# Patient Record
Sex: FEMALE | Race: BLACK OR AFRICAN AMERICAN | NOT HISPANIC OR LATINO | Employment: FULL TIME | RURAL
[De-identification: names, ages, dates, MRNs, and addresses within clinical notes are randomized per-mention and may not be internally consistent; named-entity substitution may affect disease eponyms.]

---

## 2024-06-24 ENCOUNTER — OFFICE VISIT (OUTPATIENT)
Dept: OBSTETRICS AND GYNECOLOGY | Facility: CLINIC | Age: 54
End: 2024-06-24
Payer: COMMERCIAL

## 2024-06-24 ENCOUNTER — HOSPITAL ENCOUNTER (OUTPATIENT)
Dept: RADIOLOGY | Facility: HOSPITAL | Age: 54
Discharge: HOME | End: 2024-06-24
Attending: ADVANCED PRACTICE MIDWIFE
Payer: COMMERCIAL

## 2024-06-24 VITALS — TEMPERATURE: 98 F | WEIGHT: 258.63 LBS | OXYGEN SATURATION: 96 % | HEIGHT: 67 IN | BODY MASS INDEX: 40.59 KG/M2

## 2024-06-24 DIAGNOSIS — Z86.018 HISTORY OF UTERINE FIBROID: ICD-10-CM

## 2024-06-24 DIAGNOSIS — R93.89 THICKENED ENDOMETRIUM: ICD-10-CM

## 2024-06-24 DIAGNOSIS — N92.6 ABNORMAL BLEEDING IN MENSTRUAL CYCLE: ICD-10-CM

## 2024-06-24 DIAGNOSIS — N93.8 DUB (DYSFUNCTIONAL UTERINE BLEEDING): ICD-10-CM

## 2024-06-24 DIAGNOSIS — R10.2 PELVIC PAIN: ICD-10-CM

## 2024-06-24 DIAGNOSIS — N39.0 URINARY TRACT INFECTION WITHOUT HEMATURIA, SITE UNSPECIFIED: ICD-10-CM

## 2024-06-24 DIAGNOSIS — N85.2 ENLARGED UTERUS: ICD-10-CM

## 2024-06-24 DIAGNOSIS — D25.9 UTERINE LEIOMYOMA, UNSPECIFIED LOCATION: ICD-10-CM

## 2024-06-24 DIAGNOSIS — N89.8 VAGINAL DISCHARGE: ICD-10-CM

## 2024-06-24 DIAGNOSIS — Z12.4 CERVICAL CANCER SCREENING: ICD-10-CM

## 2024-06-24 DIAGNOSIS — R10.2 PELVIC PAIN: Primary | ICD-10-CM

## 2024-06-24 LAB
B-HCG UR QL: NEGATIVE
BILIRUB SERPL-MCNC: NORMAL MG/DL
BLOOD, POC UA: NORMAL
CANDIDA SPECIES: NEGATIVE
CTP QC/QA: YES
GARDNERELLA: POSITIVE
GLUCOSE UR QL STRIP: NORMAL
KETONES UR QL STRIP: NORMAL
LEUKOCYTE ESTERASE URINE, POC: NORMAL
NITRITE, POC UA: NORMAL
PH, POC UA: 6.5
PROTEIN, POC: NORMAL
SPECIFIC GRAVITY, POC UA: >=1.03
TRICHOMONAS: NEGATIVE
UROBILINOGEN, POC UA: 1

## 2024-06-24 PROCEDURE — 88142 CYTOPATH C/V THIN LAYER: CPT | Mod: TC,GCY | Performed by: ADVANCED PRACTICE MIDWIFE

## 2024-06-24 PROCEDURE — 3008F BODY MASS INDEX DOCD: CPT | Mod: ,,, | Performed by: ADVANCED PRACTICE MIDWIFE

## 2024-06-24 PROCEDURE — 87086 URINE CULTURE/COLONY COUNT: CPT | Mod: ,,, | Performed by: CLINICAL MEDICAL LABORATORY

## 2024-06-24 PROCEDURE — 81025 URINE PREGNANCY TEST: CPT | Mod: QW,,, | Performed by: ADVANCED PRACTICE MIDWIFE

## 2024-06-24 PROCEDURE — 99204 OFFICE O/P NEW MOD 45 MIN: CPT | Mod: ,,, | Performed by: ADVANCED PRACTICE MIDWIFE

## 2024-06-24 PROCEDURE — 76856 US EXAM PELVIC COMPLETE: CPT | Mod: TC

## 2024-06-24 PROCEDURE — 87491 CHLMYD TRACH DNA AMP PROBE: CPT | Mod: ,,, | Performed by: CLINICAL MEDICAL LABORATORY

## 2024-06-24 PROCEDURE — 1159F MED LIST DOCD IN RCRD: CPT | Mod: ,,, | Performed by: ADVANCED PRACTICE MIDWIFE

## 2024-06-24 PROCEDURE — 81003 URINALYSIS AUTO W/O SCOPE: CPT | Mod: QW,,, | Performed by: ADVANCED PRACTICE MIDWIFE

## 2024-06-24 PROCEDURE — 87591 N.GONORRHOEAE DNA AMP PROB: CPT | Mod: ,,, | Performed by: CLINICAL MEDICAL LABORATORY

## 2024-06-24 PROCEDURE — 87510 GARDNER VAG DNA DIR PROBE: CPT | Mod: ,,, | Performed by: CLINICAL MEDICAL LABORATORY

## 2024-06-24 PROCEDURE — 76856 US EXAM PELVIC COMPLETE: CPT | Mod: 26,,, | Performed by: RADIOLOGY

## 2024-06-24 PROCEDURE — 87480 CANDIDA DNA DIR PROBE: CPT | Mod: ,,, | Performed by: CLINICAL MEDICAL LABORATORY

## 2024-06-24 PROCEDURE — 87660 TRICHOMONAS VAGIN DIR PROBE: CPT | Mod: ,,, | Performed by: CLINICAL MEDICAL LABORATORY

## 2024-06-24 RX ORDER — ERGOCALCIFEROL 1.25 MG/1
50000 CAPSULE ORAL
COMMUNITY

## 2024-06-24 RX ORDER — NITROFURANTOIN 25; 75 MG/1; MG/1
100 CAPSULE ORAL 2 TIMES DAILY
Qty: 14 CAPSULE | Refills: 0 | Status: SHIPPED | OUTPATIENT
Start: 2024-06-24 | End: 2024-07-01

## 2024-06-24 NOTE — PROGRESS NOTES
"Subjective     Patient ID: Erik Buchanan is a 54 y.o. female.    Chief Complaint: problem (Back pain with cycle lasting 2 to 3 weeks when it comes on)    Here with c/o prolonged menstrual bleeding with LMP 6/2/24 and continues to have bleeding off and on with bleeding noted for 2-3 weeks, off a week and continues. She states she was diagnosed with uterine fibroids in the past at a clinic in [place], [place] and denies any surgical procedures.  She c/o lower abdominal pain and lower back pain in which the pain radiates from her back to the lower abdominal area. States she has lower right pelvic pain/discomforts. She denies any f/u with any provider for lower back discomforts. Pt stats she had the same problem 2022 and had a D&C performed and was given provera "the whole month to stop the bleeding".     States she is sexually active and denies any probability of any STD exposure.    States mammogram is scheduled for 7/2024. Last pap smear was 5/2023 in [place], [place].       Review of Systems   All other systems reviewed and are negative.       Objective     Physical Exam  Vitals reviewed. Exam conducted with a chaperone present.   Constitutional:       Appearance: Normal appearance. She is obese.   HENT:      Head: Normocephalic.   Cardiovascular:      Rate and Rhythm: Normal rate and regular rhythm.   Pulmonary:      Effort: Pulmonary effort is normal.   Abdominal:      General: Abdomen is flat.      Palpations: Abdomen is soft.   Genitourinary:     Exam position: Lithotomy position.      Vagina: Vaginal discharge present.      Cervix: Discharge present.      Uterus: Enlarged.       Adnexa: Right adnexa normal and left adnexa normal.      Comments: Thin white vaginal discharge noted  Musculoskeletal:         General: Normal range of motion.      Cervical back: Normal range of motion and neck supple.   Skin:     General: Skin is warm and dry.      Capillary Refill: Capillary refill takes less than 2 seconds. "   Neurological:      General: No focal deficit present.      Mental Status: She is alert and oriented to person, place, and time.   Psychiatric:         Mood and Affect: Mood normal.         Behavior: Behavior normal.         Thought Content: Thought content normal.         Judgment: Judgment normal.       US today @ [facility]:  EXAMINATION:  US PELVIS COMPLETE NON OB     CLINICAL HISTORY:  Pelvic and perineal pain     COMPARISON:  None     TECHNIQUE:  Multiple longitudinal and transverse sonographic images of the pelvis were obtained with transabdominal probe.     FINDINGS:  The uterus measures 15.3 x 7.4 x 9.4 cm. Multiple uterine fibroids are present.  Representative measures up to 3.4 cm.  Endometrial stripe is somewhat heterogeneous and measures up the 1.2 cm in thickness.  Consider endometrial biopsy with or without hysteroscopy.  Right ovary measurement of 2.5 x 3.6 x 2.3 cm given. 2.2 cm right ovarian cyst.  Left ovary measurement of 2.6 x 2.1 x 1.7 cm given.     Impression:     Enlarged fibroid uterus. Endometrial stripe is somewhat heterogeneous and measures up the 1.2 cm in thickness.  Consider endometrial biopsy with or without hysteroscopy.  2.2 cm right ovarian cyst.     Assessment and Plan     1. Pelvic pain  -     Cancel: US OB/GYN Procedure ([facility])-Future; Future  -     US Pelvis Complete Non OB; Future; Expected date: 06/24/2024  -     Chlamydia/GC, PCR; Future; Expected date: 06/24/2024  -     POCT Urinalysis  -     POCT URINE PREGNANCY    2. History of uterine fibroid  -     Cancel: US OB/GYN Procedure ([facility])-Future; Future    3. DUB (dysfunctional uterine bleeding)  -     Cancel: US OB/GYN Procedure ([facility])-Future; Future  -     Chlamydia/GC, PCR; Future; Expected date: 06/24/2024  -     POCT Urinalysis  -     POCT URINE PREGNANCY  -     CBC Auto Differential; Future; Expected date: 06/24/2024    4. Thickened endometrium    5. Uterine leiomyoma, unspecified location    6.  Enlarged uterus    7. Abnormal bleeding in menstrual cycle    8. BMI 40.0-44.9, adult    9. Cervical cancer screening  -     ThinPrep Pap Test; Future; Expected date: 06/24/2024    10. Vaginal discharge  -     Bacterial Vaginosis; Future; Expected date: 06/24/2024        To hospital today for pelvic ultrasound.  Vitamin D and multivitamin daily  Weight loss and exercise regimen encouraged  Healthy food choices  Discussed US results and f/u with Dr. Daniels for plan of care secondary to prolonged menses, thickened endometrium, enlarged uterus, uterine fibroids. Discussed possible hysterectomy or endometrial biopsy/hysteroscopy D&C. Informed pt that plan of care will be discussed with Dr. Daniels  Verbalized understanding to all instructions and information  Questions answered to desired level of satisfaction           Follow up if symptoms worsen or fail to improve, for f/u with Dr. Daniels for hystertomy/hysteroscopy eval in 1-2 wks; 1 yr annual with me.

## 2024-06-25 LAB
CHLAMYDIA BY PCR: NEGATIVE
N. GONORRHOEAE (GC) BY PCR: NEGATIVE

## 2024-06-26 LAB
GH SERPL-MCNC: NORMAL NG/ML
INSULIN SERPL-ACNC: NORMAL U[IU]/ML
LAB AP CLINICAL INFORMATION: NORMAL
LAB AP GYN INTERPRETATION: NEGATIVE
LAB AP PAP DISCLAIMER COMMENTS: NORMAL
RENIN PLAS-CCNC: NORMAL NG/ML/H
UA COMPLETE W REFLEX CULTURE PNL UR: NORMAL

## 2024-06-27 ENCOUNTER — TELEPHONE (OUTPATIENT)
Dept: OBSTETRICS AND GYNECOLOGY | Facility: CLINIC | Age: 54
End: 2024-06-27
Payer: COMMERCIAL

## 2024-06-27 NOTE — TELEPHONE ENCOUNTER
I attempted to leave a message for patient to return a call to the clinic and her answering service keep cutting off. Will send a letter with results----- Message from Petra Dunn CNM sent at 6/24/2024  4:41 PM CDT -----  UTI- rx sent, please call pt and notify her rx sent. Urine culture ordered.

## 2024-07-08 ENCOUNTER — OFFICE VISIT (OUTPATIENT)
Dept: OBSTETRICS AND GYNECOLOGY | Facility: CLINIC | Age: 54
End: 2024-07-08
Payer: COMMERCIAL

## 2024-07-08 VITALS
DIASTOLIC BLOOD PRESSURE: 73 MMHG | BODY MASS INDEX: 40.82 KG/M2 | SYSTOLIC BLOOD PRESSURE: 130 MMHG | WEIGHT: 260.63 LBS | HEART RATE: 86 BPM

## 2024-07-08 DIAGNOSIS — Z86.018 HISTORY OF UTERINE FIBROID: ICD-10-CM

## 2024-07-08 DIAGNOSIS — N93.8 DUB (DYSFUNCTIONAL UTERINE BLEEDING): ICD-10-CM

## 2024-07-08 DIAGNOSIS — R10.2 PELVIC PAIN: Primary | ICD-10-CM

## 2024-07-08 DIAGNOSIS — N85.2 ENLARGED UTERUS: ICD-10-CM

## 2024-07-08 DIAGNOSIS — R93.89 THICKENED ENDOMETRIUM: ICD-10-CM

## 2024-07-08 LAB
BASOPHILS # BLD AUTO: 0.05 K/UL (ref 0–0.2)
BASOPHILS NFR BLD AUTO: 0.7 % (ref 0–1)
DIFFERENTIAL METHOD BLD: ABNORMAL
EOSINOPHIL # BLD AUTO: 0.35 K/UL (ref 0–0.5)
EOSINOPHIL NFR BLD AUTO: 5 % (ref 1–4)
ERYTHROCYTE [DISTWIDTH] IN BLOOD BY AUTOMATED COUNT: 14.6 % (ref 11.5–14.5)
FSH SERPL-ACNC: 4.3 MIU/ML (ref 1.7–134.8)
HCT VFR BLD AUTO: 40.7 % (ref 38–47)
HGB BLD-MCNC: 12.7 G/DL (ref 12–16)
IMM GRANULOCYTES # BLD AUTO: 0.01 K/UL (ref 0–0.04)
IMM GRANULOCYTES NFR BLD: 0.1 % (ref 0–0.4)
LH SERPL-ACNC: 1.1 MIU/ML
LYMPHOCYTES # BLD AUTO: 2.71 K/UL (ref 1–4.8)
LYMPHOCYTES NFR BLD AUTO: 38.5 % (ref 27–41)
MCH RBC QN AUTO: 27.3 PG (ref 27–31)
MCHC RBC AUTO-ENTMCNC: 31.2 G/DL (ref 32–36)
MCV RBC AUTO: 87.3 FL (ref 80–96)
MONOCYTES # BLD AUTO: 0.6 K/UL (ref 0–0.8)
MONOCYTES NFR BLD AUTO: 8.5 % (ref 2–6)
MPC BLD CALC-MCNC: 12.9 FL (ref 9.4–12.4)
NEUTROPHILS # BLD AUTO: 3.31 K/UL (ref 1.8–7.7)
NEUTROPHILS NFR BLD AUTO: 47.2 % (ref 53–65)
NRBC # BLD AUTO: 0 X10E3/UL
NRBC, AUTO (.00): 0 %
PLATELET # BLD AUTO: 210 K/UL (ref 150–400)
RBC # BLD AUTO: 4.66 M/UL (ref 4.2–5.4)
TESTOST SERPL-MCNC: 14 NG/DL (ref 8–60)
TSH SERPL DL<=0.005 MIU/L-ACNC: 1.2 UIU/ML (ref 0.36–3.74)
WBC # BLD AUTO: 7.03 K/UL (ref 4.5–11)

## 2024-07-08 PROCEDURE — 83002 ASSAY OF GONADOTROPIN (LH): CPT | Mod: ,,, | Performed by: CLINICAL MEDICAL LABORATORY

## 2024-07-08 PROCEDURE — 85025 COMPLETE CBC W/AUTO DIFF WBC: CPT | Mod: ,,, | Performed by: CLINICAL MEDICAL LABORATORY

## 2024-07-08 PROCEDURE — 3075F SYST BP GE 130 - 139MM HG: CPT | Mod: ,,, | Performed by: OBSTETRICS & GYNECOLOGY

## 2024-07-08 PROCEDURE — 36415 COLL VENOUS BLD VENIPUNCTURE: CPT | Mod: ,,, | Performed by: OBSTETRICS & GYNECOLOGY

## 2024-07-08 PROCEDURE — 99214 OFFICE O/P EST MOD 30 MIN: CPT | Mod: ,,, | Performed by: OBSTETRICS & GYNECOLOGY

## 2024-07-08 PROCEDURE — 3008F BODY MASS INDEX DOCD: CPT | Mod: ,,, | Performed by: OBSTETRICS & GYNECOLOGY

## 2024-07-08 PROCEDURE — 84443 ASSAY THYROID STIM HORMONE: CPT | Mod: ,,, | Performed by: CLINICAL MEDICAL LABORATORY

## 2024-07-08 PROCEDURE — 83001 ASSAY OF GONADOTROPIN (FSH): CPT | Mod: ,,, | Performed by: CLINICAL MEDICAL LABORATORY

## 2024-07-08 PROCEDURE — 3078F DIAST BP <80 MM HG: CPT | Mod: ,,, | Performed by: OBSTETRICS & GYNECOLOGY

## 2024-07-08 PROCEDURE — 84403 ASSAY OF TOTAL TESTOSTERONE: CPT | Mod: ,,, | Performed by: CLINICAL MEDICAL LABORATORY

## 2024-07-29 ENCOUNTER — PROCEDURE VISIT (OUTPATIENT)
Dept: OBSTETRICS AND GYNECOLOGY | Facility: CLINIC | Age: 54
End: 2024-07-29
Payer: COMMERCIAL

## 2024-07-29 VITALS
WEIGHT: 263.38 LBS | DIASTOLIC BLOOD PRESSURE: 72 MMHG | HEART RATE: 76 BPM | BODY MASS INDEX: 41.25 KG/M2 | SYSTOLIC BLOOD PRESSURE: 149 MMHG

## 2024-07-29 DIAGNOSIS — Z86.018 HISTORY OF UTERINE FIBROID: ICD-10-CM

## 2024-07-29 DIAGNOSIS — N85.2 ENLARGED UTERUS: ICD-10-CM

## 2024-07-29 DIAGNOSIS — R10.2 PELVIC PAIN: ICD-10-CM

## 2024-07-29 DIAGNOSIS — R93.89 THICKENED ENDOMETRIUM: ICD-10-CM

## 2024-07-29 DIAGNOSIS — N93.8 DUB (DYSFUNCTIONAL UTERINE BLEEDING): Primary | ICD-10-CM

## 2024-07-29 LAB
B-HCG UR QL: NEGATIVE
CTP QC/QA: YES

## 2024-07-29 PROCEDURE — 81025 URINE PREGNANCY TEST: CPT | Mod: QW,,, | Performed by: OBSTETRICS & GYNECOLOGY

## 2024-07-29 PROCEDURE — 88305 TISSUE EXAM BY PATHOLOGIST: CPT | Mod: 26,,, | Performed by: PATHOLOGY

## 2024-07-29 PROCEDURE — 58558 HYSTEROSCOPY BIOPSY: CPT | Mod: ,,, | Performed by: OBSTETRICS & GYNECOLOGY

## 2024-07-29 PROCEDURE — 88305 TISSUE EXAM BY PATHOLOGIST: CPT | Mod: TC,SUR | Performed by: OBSTETRICS & GYNECOLOGY

## 2024-07-29 PROCEDURE — 99499 UNLISTED E&M SERVICE: CPT | Mod: ,,, | Performed by: OBSTETRICS & GYNECOLOGY

## 2024-07-31 LAB
DHEA SERPL-MCNC: NORMAL
ESTROGEN SERPL-MCNC: NORMAL PG/ML
INSULIN SERPL-ACNC: NORMAL U[IU]/ML
LAB AP CLINICAL INFORMATION: NORMAL
LAB AP GROSS DESCRIPTION: NORMAL
LAB AP LABORATORY NOTES: NORMAL
T3RU NFR SERPL: NORMAL %

## 2024-08-01 NOTE — PROCEDURES
Hysteorscopy, EMB Procedure Note:    Following informed written consent, the pt was placed in the lithotomy position. The speculum was inserted into the vagina and the cervix visualized with ease. The cervix was cleaned with betadine.  The uterus was sounded to 8 cm with the uterine sound. The hysteroscope was then inserted into the cervical os and the uterine cavity was directly visualized, bilateral ostea were noted at that time. Findings no lesions, polyps or fibroids seen. The hysteroscope was then removed and a biopsy of the endometrium performed with a pipelle. Specimen sent to pathology for evaluation. The single tooth tenaculum was then removed and the cervix was made hemostatic with pressure.

## 2024-08-12 ENCOUNTER — OFFICE VISIT (OUTPATIENT)
Dept: OBSTETRICS AND GYNECOLOGY | Facility: CLINIC | Age: 54
End: 2024-08-12
Payer: COMMERCIAL

## 2024-08-12 VITALS
HEART RATE: 78 BPM | BODY MASS INDEX: 41.04 KG/M2 | WEIGHT: 262 LBS | DIASTOLIC BLOOD PRESSURE: 74 MMHG | SYSTOLIC BLOOD PRESSURE: 136 MMHG

## 2024-08-12 DIAGNOSIS — N93.8 DUB (DYSFUNCTIONAL UTERINE BLEEDING): ICD-10-CM

## 2024-08-12 DIAGNOSIS — Z86.018 HISTORY OF UTERINE FIBROID: Primary | ICD-10-CM

## 2024-08-12 DIAGNOSIS — R10.2 PELVIC PAIN: ICD-10-CM

## 2024-08-12 DIAGNOSIS — N85.2 ENLARGED UTERUS: ICD-10-CM

## 2024-08-12 PROCEDURE — 3075F SYST BP GE 130 - 139MM HG: CPT | Mod: ,,, | Performed by: OBSTETRICS & GYNECOLOGY

## 2024-08-12 PROCEDURE — 3008F BODY MASS INDEX DOCD: CPT | Mod: ,,, | Performed by: OBSTETRICS & GYNECOLOGY

## 2024-08-12 PROCEDURE — 1159F MED LIST DOCD IN RCRD: CPT | Mod: ,,, | Performed by: OBSTETRICS & GYNECOLOGY

## 2024-08-12 PROCEDURE — 3078F DIAST BP <80 MM HG: CPT | Mod: ,,, | Performed by: OBSTETRICS & GYNECOLOGY

## 2024-08-12 PROCEDURE — 99214 OFFICE O/P EST MOD 30 MIN: CPT | Mod: ,,, | Performed by: OBSTETRICS & GYNECOLOGY

## 2024-08-12 NOTE — PROGRESS NOTES
Subjective     Patient ID: Erik Buchanan is a 54 y.o. female.    Chief Complaint:  Follow-up      History of Present Illness  HPI  at 6:00 AM.    GYN & OB History  Patient's last menstrual period was 2024 (approximate).   Date of Last Pap: 2024    OB History    Para Term  AB Living   4 2 1 1 1     SAB IAB Ectopic Multiple Live Births   1              # Outcome Date GA Lbr Alexandru/2nd Weight Sex Type Anes PTL Lv   4 Current            3  13 28w0d  1.332 kg (2 lb 15 oz) M Vag-Spont      2 SAB 1996 6w0d   U       1 Term 91 40w0d  3.941 kg (8 lb 11 oz) M Vag-Spont          Review of Systems  Review of Systems   Constitutional:  Negative for activity change, appetite change, fatigue and unexpected weight change.   HENT: Negative.     Respiratory:  Negative for cough, shortness of breath and wheezing.    Cardiovascular:  Negative for chest pain, palpitations and leg swelling.   Gastrointestinal:  Negative for abdominal pain, bloating, blood in stool, constipation, diarrhea, nausea, vomiting and reflux.   Genitourinary:  Positive for menorrhagia and menstrual problem. Negative for bladder incontinence, decreased libido, dysmenorrhea, dyspareunia, dysuria, flank pain, frequency, pelvic pain, urgency, vaginal bleeding, vaginal discharge, postcoital bleeding and vaginal odor.   Musculoskeletal:  Negative for back pain.   Integumentary:  Negative for rash, acne, hair changes, mole/lesion, breast mass, nipple discharge, breast skin changes and breast tenderness.   Neurological:  Negative for headaches.   Psychiatric/Behavioral:  Negative for depression and sleep disturbance. The patient is not nervous/anxious.    Breast: Negative for asymmetry, breast self exam, lump, mass, nipple discharge, skin changes and tenderness         Objective   Physical Exam:   Constitutional: She is oriented to person, place, and time. She appears well-developed and well-nourished.    HENT:   Head:  Normocephalic and atraumatic.    Eyes: Pupils are equal, round, and reactive to light. EOM are normal.     Cardiovascular:  Normal rate, regular rhythm and normal heart sounds.             Pulmonary/Chest: Effort normal and breath sounds normal.        Abdominal: Soft. Bowel sounds are normal.             Musculoskeletal: Normal range of motion and moves all extremeties.       Neurological: She is alert and oriented to person, place, and time. She has normal reflexes.     Psychiatric: She has a normal mood and affect. Her behavior is normal. Judgment and thought content normal.            Assessment and Plan     1. History of uterine fibroid    2. Pelvic pain    3. DUB (dysfunctional uterine bleeding)    4. Enlarged uterus             Plan:  Reviewed pathology results with patient.    A. Endometrium, biopsy:  - Disordered proliferative endometrium        Patient would like to proceed with robotic hysterectomy with bilateral salpingectomy and oophorectomy at [facility] on 09/19/2024

## 2024-09-16 ENCOUNTER — OFFICE VISIT (OUTPATIENT)
Dept: OBSTETRICS AND GYNECOLOGY | Facility: CLINIC | Age: 54
End: 2024-09-16
Payer: COMMERCIAL

## 2024-09-16 VITALS
HEART RATE: 86 BPM | WEIGHT: 263 LBS | DIASTOLIC BLOOD PRESSURE: 66 MMHG | SYSTOLIC BLOOD PRESSURE: 137 MMHG | BODY MASS INDEX: 41.19 KG/M2

## 2024-09-16 DIAGNOSIS — Z86.018 HISTORY OF UTERINE FIBROID: ICD-10-CM

## 2024-09-16 DIAGNOSIS — N93.8 DUB (DYSFUNCTIONAL UTERINE BLEEDING): Primary | ICD-10-CM

## 2024-09-16 DIAGNOSIS — Z01.818 PREOP TESTING: ICD-10-CM

## 2024-09-16 DIAGNOSIS — R10.2 PELVIC PAIN: ICD-10-CM

## 2024-09-16 LAB
ALBUMIN SERPL BCP-MCNC: 3.2 G/DL (ref 3.5–5)
ALBUMIN/GLOB SERPL: 0.7 {RATIO}
ALP SERPL-CCNC: 69 U/L (ref 41–108)
ALT SERPL W P-5'-P-CCNC: 21 U/L (ref 13–56)
ANION GAP SERPL CALCULATED.3IONS-SCNC: 11 MMOL/L (ref 7–16)
AST SERPL W P-5'-P-CCNC: 20 U/L (ref 15–37)
BACTERIA #/AREA URNS HPF: ABNORMAL /HPF
BASOPHILS # BLD AUTO: 0.07 K/UL (ref 0–0.2)
BASOPHILS NFR BLD AUTO: 0.9 % (ref 0–1)
BILIRUB SERPL-MCNC: 0.3 MG/DL (ref ?–1.2)
BILIRUB UR QL STRIP: NEGATIVE
BUN SERPL-MCNC: 8 MG/DL (ref 7–18)
BUN/CREAT SERPL: 16 (ref 6–20)
CALCIUM SERPL-MCNC: 9.3 MG/DL (ref 8.5–10.1)
CHLORIDE SERPL-SCNC: 107 MMOL/L (ref 98–107)
CLARITY UR: ABNORMAL
CO2 SERPL-SCNC: 26 MMOL/L (ref 21–32)
COLOR UR: YELLOW
CREAT SERPL-MCNC: 0.49 MG/DL (ref 0.55–1.02)
DIFFERENTIAL METHOD BLD: ABNORMAL
EGFR (NO RACE VARIABLE) (RUSH/TITUS): 113 ML/MIN/1.73M2
EOSINOPHIL # BLD AUTO: 0.18 K/UL (ref 0–0.5)
EOSINOPHIL NFR BLD AUTO: 2.2 % (ref 1–4)
ERYTHROCYTE [DISTWIDTH] IN BLOOD BY AUTOMATED COUNT: 14.3 % (ref 11.5–14.5)
GLOBULIN SER-MCNC: 4.4 G/DL (ref 2–4)
GLUCOSE SERPL-MCNC: 71 MG/DL (ref 74–106)
GLUCOSE UR STRIP-MCNC: NORMAL MG/DL
HCG SERPL-ACNC: <1 MIU/ML
HCT VFR BLD AUTO: 38.4 % (ref 38–47)
HGB BLD-MCNC: 12.4 G/DL (ref 12–16)
IMM GRANULOCYTES # BLD AUTO: 0.02 K/UL (ref 0–0.04)
IMM GRANULOCYTES NFR BLD: 0.2 % (ref 0–0.4)
INDIRECT COOMBS: NORMAL
KETONES UR STRIP-SCNC: NEGATIVE MG/DL
LEUKOCYTE ESTERASE UR QL STRIP: ABNORMAL
LYMPHOCYTES # BLD AUTO: 2.58 K/UL (ref 1–4.8)
LYMPHOCYTES NFR BLD AUTO: 31.9 % (ref 27–41)
MCH RBC QN AUTO: 27.7 PG (ref 27–31)
MCHC RBC AUTO-ENTMCNC: 32.3 G/DL (ref 32–36)
MCV RBC AUTO: 85.9 FL (ref 80–96)
MONOCYTES # BLD AUTO: 0.7 K/UL (ref 0–0.8)
MONOCYTES NFR BLD AUTO: 8.7 % (ref 2–6)
MPC BLD CALC-MCNC: 12.4 FL (ref 9.4–12.4)
MUCOUS, UA: ABNORMAL /LPF
NEUTROPHILS # BLD AUTO: 4.54 K/UL (ref 1.8–7.7)
NEUTROPHILS NFR BLD AUTO: 56.1 % (ref 53–65)
NITRITE UR QL STRIP: NEGATIVE
NRBC # BLD AUTO: 0 X10E3/UL
NRBC, AUTO (.00): 0 %
PH UR STRIP: 6 PH UNITS
PLATELET # BLD AUTO: 239 K/UL (ref 150–400)
POTASSIUM SERPL-SCNC: 3.6 MMOL/L (ref 3.5–5.1)
PROT SERPL-MCNC: 7.6 G/DL (ref 6.4–8.2)
PROT UR QL STRIP: 20
RBC # BLD AUTO: 4.47 M/UL (ref 4.2–5.4)
RBC # UR STRIP: NEGATIVE /UL
RBC #/AREA URNS HPF: 5 /HPF
RH BLD: NORMAL
SODIUM SERPL-SCNC: 140 MMOL/L (ref 136–145)
SP GR UR STRIP: 1.03
SPECIMEN OUTDATE: NORMAL
SQUAMOUS #/AREA URNS LPF: ABNORMAL /HPF
UROBILINOGEN UR STRIP-ACNC: NORMAL MG/DL
WBC # BLD AUTO: 8.09 K/UL (ref 4.5–11)
WBC #/AREA URNS HPF: 16 /HPF

## 2024-09-16 PROCEDURE — 86901 BLOOD TYPING SEROLOGIC RH(D): CPT | Mod: ,,, | Performed by: CLINICAL MEDICAL LABORATORY

## 2024-09-16 PROCEDURE — 3008F BODY MASS INDEX DOCD: CPT | Mod: ,,, | Performed by: OBSTETRICS & GYNECOLOGY

## 2024-09-16 PROCEDURE — 86850 RBC ANTIBODY SCREEN: CPT | Mod: ,,, | Performed by: CLINICAL MEDICAL LABORATORY

## 2024-09-16 PROCEDURE — 3078F DIAST BP <80 MM HG: CPT | Mod: ,,, | Performed by: OBSTETRICS & GYNECOLOGY

## 2024-09-16 PROCEDURE — 80053 COMPREHEN METABOLIC PANEL: CPT | Mod: ,,, | Performed by: CLINICAL MEDICAL LABORATORY

## 2024-09-16 PROCEDURE — 36415 COLL VENOUS BLD VENIPUNCTURE: CPT | Mod: ,,, | Performed by: OBSTETRICS & GYNECOLOGY

## 2024-09-16 PROCEDURE — 87086 URINE CULTURE/COLONY COUNT: CPT | Mod: ,,, | Performed by: CLINICAL MEDICAL LABORATORY

## 2024-09-16 PROCEDURE — 1159F MED LIST DOCD IN RCRD: CPT | Mod: ,,, | Performed by: OBSTETRICS & GYNECOLOGY

## 2024-09-16 PROCEDURE — 99499 UNLISTED E&M SERVICE: CPT | Mod: ,,, | Performed by: OBSTETRICS & GYNECOLOGY

## 2024-09-16 PROCEDURE — 84702 CHORIONIC GONADOTROPIN TEST: CPT | Mod: ,,, | Performed by: CLINICAL MEDICAL LABORATORY

## 2024-09-16 PROCEDURE — 86900 BLOOD TYPING SEROLOGIC ABO: CPT | Mod: ,,, | Performed by: CLINICAL MEDICAL LABORATORY

## 2024-09-16 PROCEDURE — 3075F SYST BP GE 130 - 139MM HG: CPT | Mod: ,,, | Performed by: OBSTETRICS & GYNECOLOGY

## 2024-09-16 PROCEDURE — 81001 URINALYSIS AUTO W/SCOPE: CPT | Mod: ,,, | Performed by: CLINICAL MEDICAL LABORATORY

## 2024-09-16 PROCEDURE — 85025 COMPLETE CBC W/AUTO DIFF WBC: CPT | Mod: ,,, | Performed by: CLINICAL MEDICAL LABORATORY

## 2024-09-16 NOTE — PROGRESS NOTES
History & Physical    SUBJECTIVE:     History of Present Illness:  Patient is a 54 y.o. female presents for pre op appointment. Patient is scheduled for hysterectomy with bilateral salpingectomy and oophorectomy on 09/19/2024 secondary to menorrhagia/DUB.      Chief Complaint   Patient presents with    Pre-op Exam       Review of patient's allergies indicates:  No Known Allergies    Current Outpatient Medications   Medication Sig Dispense Refill    ergocalciferol (VITAMIN D2) 50,000 unit Cap Take 50,000 Units by mouth every 7 days.      multivitamin with minerals tablet Take 1 tablet by mouth once daily.      multivitamin with minerals tablet Take 1 tablet by mouth once daily. (Patient not taking: Reported on 7/8/2024)       No current facility-administered medications for this visit.       Past Medical History:   Diagnosis Date    Anemia     Asthma     Fibroids 2013     Past Surgical History:   Procedure Laterality Date    DILATION AND CURETTAGE OF UTERUS  2022    performed due to prolonged heavy menses    SINUS SURGERY      TUBAL LIGATION       Family History   Problem Relation Name Age of Onset    Diabetes Maternal Grandmother      Diabetes Mother       Social History     Tobacco Use    Smoking status: Never     Passive exposure: Current    Smokeless tobacco: Never    Tobacco comments:     Pt works at a [employer].   Substance Use Topics    Alcohol use: Never    Drug use: Never        Review of Systems:  Review of Systems   Constitutional:  Negative for appetite change, chills, fatigue and fever.   HENT: Negative.     Eyes: Negative.    Respiratory:  Negative for cough, chest tightness and shortness of breath.    Cardiovascular:  Negative for chest pain, palpitations and leg swelling.   Gastrointestinal:  Negative for abdominal distention, abdominal pain, blood in stool, constipation, diarrhea, nausea and vomiting.   Endocrine: Negative for cold intolerance, heat intolerance, polydipsia, polyphagia and polyuria.    Genitourinary:  Positive for menstrual problem and pelvic pain. Negative for difficulty urinating, dyspareunia, dysuria, flank pain, frequency, urgency, vaginal bleeding, vaginal discharge and vaginal pain.   Musculoskeletal: Negative.    Skin: Negative.    Neurological: Negative.    Psychiatric/Behavioral: Negative.  Negative for agitation, behavioral problems, confusion and sleep disturbance. The patient is not nervous/anxious.        OBJECTIVE:     Vital Signs (Most Recent)  Pulse: 86 (09/16/24 1425)  BP: 137/66 (09/16/24 1425)     119.3 kg (263 lb)     Physical Exam:  Physical Exam  Vitals reviewed. Exam conducted with a chaperone present.   Constitutional:       Appearance: Normal appearance.   HENT:      Head: Normocephalic and atraumatic.      Mouth/Throat:      Mouth: Mucous membranes are moist.   Eyes:      Extraocular Movements: Extraocular movements intact.      Pupils: Pupils are equal, round, and reactive to light.   Cardiovascular:      Rate and Rhythm: Normal rate and regular rhythm.      Pulses: Normal pulses.      Heart sounds: Normal heart sounds.   Pulmonary:      Effort: Pulmonary effort is normal.      Breath sounds: Normal breath sounds.   Abdominal:      General: Abdomen is flat. Bowel sounds are normal.      Palpations: Abdomen is soft.   Musculoskeletal:         General: Normal range of motion.      Cervical back: Normal range of motion.   Skin:     General: Skin is warm and dry.   Neurological:      General: No focal deficit present.      Mental Status: She is alert and oriented to person, place, and time.   Psychiatric:         Mood and Affect: Mood normal.         Behavior: Behavior normal.         Thought Content: Thought content normal.         Judgment: Judgment normal.         Laboratory  Office Visit on 09/16/2024   Component Date Value Ref Range Status    HCG Quantitative 09/16/2024 <1  mIU/mL Final    Non-pregnant females aged 18 years to 62 years: 1 - 3 mIU/mL    Gestational Age           HCG mIU/mL  -------------------            -----------------  0.2 - 1 wk:                    5 - 50 mIU/mL  1 - 2 wks:                     50 - 500 mIU/mL  2 - 3 wks:                     100 - 5,000 mIU/mL  3 - 4 wks:                     500 - 10,000 mIU/mL  4 - 5 wks:                     1,000 - 50,000 mIU/mL  5 - 6 wks:                     10,000 - 100,000 mIU/mL  6 - 8 wks:                     15,000 - 200,000 mIU/mL  2 - 3 months:               10,000 - 100,000 mIU/mL    Sodium 09/16/2024 140  136 - 145 mmol/L Final    Potassium 09/16/2024 3.6  3.5 - 5.1 mmol/L Final    Chloride 09/16/2024 107  98 - 107 mmol/L Final    CO2 09/16/2024 26  21 - 32 mmol/L Final    Anion Gap 09/16/2024 11  7 - 16 mmol/L Final    Glucose 09/16/2024 71 (L)  74 - 106 mg/dL Final    BUN 09/16/2024 8  7 - 18 mg/dL Final    Creatinine 09/16/2024 0.49 (L)  0.55 - 1.02 mg/dL Final    BUN/Creatinine Ratio 09/16/2024 16  6 - 20 Final    Calcium 09/16/2024 9.3  8.5 - 10.1 mg/dL Final    Total Protein 09/16/2024 7.6  6.4 - 8.2 g/dL Final    Albumin 09/16/2024 3.2 (L)  3.5 - 5.0 g/dL Final    Globulin 09/16/2024 4.4 (H)  2.0 - 4.0 g/dL Final    A/G Ratio 09/16/2024 0.7   Final    Bilirubin, Total 09/16/2024 0.3  >0.0 - 1.2 mg/dL Final    Alk Phos 09/16/2024 69  41 - 108 U/L Final    ALT 09/16/2024 21  13 - 56 U/L Final    AST 09/16/2024 20  15 - 37 U/L Final    eGFR 09/16/2024 113  >=60 mL/min/1.73m2 Final    Specimen Outdate 09/16/2024 09/19/2024 23:59   Final    Group & Rh 09/16/2024 A POS   Final    Indirect Chas 09/16/2024 NEG   Final    Color, UA 09/16/2024 Yellow  Colorless, Straw, Yellow, Light Yellow, Dark Yellow Final    Clarity, UA 09/16/2024 Ex.Turbid  Clear Final    pH, UA 09/16/2024 6.0  5.0 to 8.0 pH Units Final    Leukocytes, UA 09/16/2024 Small (A)  Negative Final    Nitrites, UA 09/16/2024 Negative  Negative Final    Protein, UA 09/16/2024 20 (A)  Negative Final    Glucose, UA 09/16/2024 Normal  Normal mg/dL Final     Ketones, UA 09/16/2024 Negative  Negative mg/dL Final    Urobilinogen, UA 09/16/2024 Normal  0.2, 1.0, Normal mg/dL Final    Bilirubin, UA 09/16/2024 Negative  Negative Final    Blood, UA 09/16/2024 Negative  Negative Final    Specific Gravity, UA 09/16/2024 1.026  <=1.030 Final    WBC 09/16/2024 8.09  4.50 - 11.00 K/uL Final    RBC 09/16/2024 4.47  4.20 - 5.40 M/uL Final    Hemoglobin 09/16/2024 12.4  12.0 - 16.0 g/dL Final    Hematocrit 09/16/2024 38.4  38.0 - 47.0 % Final    MCV 09/16/2024 85.9  80.0 - 96.0 fL Final    MCH 09/16/2024 27.7  27.0 - 31.0 pg Final    MCHC 09/16/2024 32.3  32.0 - 36.0 g/dL Final    RDW 09/16/2024 14.3  11.5 - 14.5 % Final    Platelet Count 09/16/2024 239  150 - 400 K/uL Final    MPV 09/16/2024 12.4  9.4 - 12.4 fL Final    Neutrophils % 09/16/2024 56.1  53.0 - 65.0 % Final    Lymphocytes % 09/16/2024 31.9  27.0 - 41.0 % Final    Monocytes % 09/16/2024 8.7 (H)  2.0 - 6.0 % Final    Eosinophils % 09/16/2024 2.2  1.0 - 4.0 % Final    Basophils % 09/16/2024 0.9  0.0 - 1.0 % Final    Immature Granulocytes % 09/16/2024 0.2  0.0 - 0.4 % Final    nRBC, Auto 09/16/2024 0.0  <=0.0 % Final    Neutrophils, Abs 09/16/2024 4.54  1.80 - 7.70 K/uL Final    Lymphocytes, Absolute 09/16/2024 2.58  1.00 - 4.80 K/uL Final    Monocytes, Absolute 09/16/2024 0.70  0.00 - 0.80 K/uL Final    Eosinophils, Absolute 09/16/2024 0.18  0.00 - 0.50 K/uL Final    Basophils, Absolute 09/16/2024 0.07  0.00 - 0.20 K/uL Final    Immature Granulocytes, Absolute 09/16/2024 0.02  0.00 - 0.04 K/uL Final    nRBC, Absolute 09/16/2024 0.00  <=0.00 x10e3/uL Final    Diff Type 09/16/2024 Auto   Final    WBC, UA 09/16/2024 16 (H)  <=5 /hpf Final    RBC, UA 09/16/2024 5 (H)  <=3 /hpf Final    Bacteria, UA 09/16/2024 Few (A)  None Seen /hpf Final    Squamous Epithelial Cells, UA 09/16/2024 Many (A)  None Seen /HPF Final    Mucous 09/16/2024 Many (A)  None Seen /LPF Final    Culture, Urine 09/16/2024 Skin/Urogenital [place] Isolated, no  further workup.   Final         Diagnostic Results:  US: Reviewed      ASSESSMENT/PLAN:   There are no diagnoses linked to this encounter.    PLAN:Plan      Pt scheduled for TLH, BSO on 9/19/2024  Risks, benefits and alternatives to the procedure reviewed with the pt  After discussion of the risk, alternatives, benefits, and indication of surgery, as well as the risk of surgery, questions were sought and answered and informed consent obtained to proceed with surgery. We discussed the risk of the procedures to include, but not be limited to, 1) bleeding, which could be possibly excessive, potentially requiring a blood transfusion and subsequent risk of hepatitis (1 in 300,000), transfusion reaction (<1%), and HIV (1 in 1,000,000); 2) injury to internal organs including the bowel, bladder, great vessels, nerves, and ureters, potentially requiring further surgery at that time or at a later date; 3) infection requiring a prolonged hospital stay and antibiotics with possible drainage of an abscess or wound breakdown; 4) anesthetic complications; 5) deep venous thrombosis and the risk of pulmonary emboli; 6) pneumonia; and 7) infertility and/or menopause, 8) possible death. All questions were answered and a consent form was signed. She stated she understood the above risks and desired to proceed.   All questions answered to the level of the patient's satisfaction.   Case request and orders done.   Written materials provided  Consent obtained.

## 2024-09-18 LAB — UA COMPLETE W REFLEX CULTURE PNL UR: NORMAL

## 2024-09-19 ENCOUNTER — ANESTHESIA (OUTPATIENT)
Dept: SURGERY | Facility: HOSPITAL | Age: 54
End: 2024-09-19
Payer: COMMERCIAL

## 2024-09-19 ENCOUNTER — HOSPITAL ENCOUNTER (OUTPATIENT)
Facility: HOSPITAL | Age: 54
Discharge: HOME | End: 2024-09-19
Attending: OBSTETRICS & GYNECOLOGY | Admitting: OBSTETRICS & GYNECOLOGY
Payer: COMMERCIAL

## 2024-09-19 ENCOUNTER — ANESTHESIA EVENT (OUTPATIENT)
Dept: SURGERY | Facility: HOSPITAL | Age: 54
End: 2024-09-19
Payer: COMMERCIAL

## 2024-09-19 VITALS
SYSTOLIC BLOOD PRESSURE: 131 MMHG | WEIGHT: 263 LBS | RESPIRATION RATE: 20 BRPM | HEIGHT: 67 IN | HEART RATE: 87 BPM | TEMPERATURE: 97 F | BODY MASS INDEX: 41.28 KG/M2 | OXYGEN SATURATION: 96 % | DIASTOLIC BLOOD PRESSURE: 83 MMHG

## 2024-09-19 DIAGNOSIS — Z86.018 HISTORY OF UTERINE FIBROID: ICD-10-CM

## 2024-09-19 DIAGNOSIS — R10.2 PELVIC PAIN: ICD-10-CM

## 2024-09-19 DIAGNOSIS — N93.8 DUB (DYSFUNCTIONAL UTERINE BLEEDING): ICD-10-CM

## 2024-09-19 DIAGNOSIS — Z90.710 STATUS POST LAPAROSCOPIC HYSTERECTOMY: Primary | ICD-10-CM

## 2024-09-19 DIAGNOSIS — N85.2 ENLARGED UTERUS: ICD-10-CM

## 2024-09-19 LAB
BASOPHILS # BLD AUTO: 0.02 K/UL (ref 0–0.2)
BASOPHILS NFR BLD AUTO: 0.2 % (ref 0–1)
DIFFERENTIAL METHOD BLD: ABNORMAL
EOSINOPHIL # BLD AUTO: 0 K/UL (ref 0–0.5)
EOSINOPHIL NFR BLD AUTO: 0 % (ref 1–4)
ERYTHROCYTE [DISTWIDTH] IN BLOOD BY AUTOMATED COUNT: 14.1 % (ref 11.5–14.5)
HCT VFR BLD AUTO: 37.8 % (ref 38–47)
HGB BLD-MCNC: 12.1 G/DL (ref 12–16)
IMM GRANULOCYTES # BLD AUTO: 0.05 K/UL (ref 0–0.04)
IMM GRANULOCYTES NFR BLD: 0.5 % (ref 0–0.4)
LYMPHOCYTES # BLD AUTO: 1.08 K/UL (ref 1–4.8)
LYMPHOCYTES NFR BLD AUTO: 11.1 % (ref 27–41)
MCH RBC QN AUTO: 27.6 PG (ref 27–31)
MCHC RBC AUTO-ENTMCNC: 32 G/DL (ref 32–36)
MCV RBC AUTO: 86.3 FL (ref 80–96)
MONOCYTES # BLD AUTO: 0.12 K/UL (ref 0–0.8)
MONOCYTES NFR BLD AUTO: 1.2 % (ref 2–6)
MPC BLD CALC-MCNC: 11.7 FL (ref 9.4–12.4)
NEUTROPHILS # BLD AUTO: 8.46 K/UL (ref 1.8–7.7)
NEUTROPHILS NFR BLD AUTO: 87 % (ref 53–65)
NRBC # BLD AUTO: 0 X10E3/UL
NRBC, AUTO (.00): 0 %
PLATELET # BLD AUTO: 215 K/UL (ref 150–400)
RBC # BLD AUTO: 4.38 M/UL (ref 4.2–5.4)
WBC # BLD AUTO: 9.73 K/UL (ref 4.5–11)

## 2024-09-19 PROCEDURE — 27000716 HC OXISENSOR PROBE, ANY SIZE: Performed by: ANESTHESIOLOGY

## 2024-09-19 PROCEDURE — 27000509 HC TUBE SALEM SUMP ANY SIZE: Performed by: ANESTHESIOLOGY

## 2024-09-19 PROCEDURE — 25000003 PHARM REV CODE 250

## 2024-09-19 PROCEDURE — 88307 TISSUE EXAM BY PATHOLOGIST: CPT | Mod: TC,SUR | Performed by: OBSTETRICS & GYNECOLOGY

## 2024-09-19 PROCEDURE — 71000033 HC RECOVERY, INTIAL HOUR: Performed by: OBSTETRICS & GYNECOLOGY

## 2024-09-19 PROCEDURE — 71000016 HC POSTOP RECOV ADDL HR: Performed by: OBSTETRICS & GYNECOLOGY

## 2024-09-19 PROCEDURE — C2628 CATHETER, OCCLUSION: HCPCS | Performed by: OBSTETRICS & GYNECOLOGY

## 2024-09-19 PROCEDURE — 27000510 HC BLANKET BAIR HUGGER ANY SIZE: Performed by: ANESTHESIOLOGY

## 2024-09-19 PROCEDURE — 63600175 PHARM REV CODE 636 W HCPCS

## 2024-09-19 PROCEDURE — 71000015 HC POSTOP RECOV 1ST HR: Performed by: OBSTETRICS & GYNECOLOGY

## 2024-09-19 PROCEDURE — 85025 COMPLETE CBC W/AUTO DIFF WBC: CPT | Performed by: OBSTETRICS & GYNECOLOGY

## 2024-09-19 PROCEDURE — 36000711: Performed by: OBSTETRICS & GYNECOLOGY

## 2024-09-19 PROCEDURE — 63600175 PHARM REV CODE 636 W HCPCS: Performed by: ANESTHESIOLOGY

## 2024-09-19 PROCEDURE — 36415 COLL VENOUS BLD VENIPUNCTURE: CPT | Performed by: OBSTETRICS & GYNECOLOGY

## 2024-09-19 PROCEDURE — 37000009 HC ANESTHESIA EA ADD 15 MINS: Performed by: OBSTETRICS & GYNECOLOGY

## 2024-09-19 PROCEDURE — 27000655: Performed by: ANESTHESIOLOGY

## 2024-09-19 PROCEDURE — 37000008 HC ANESTHESIA 1ST 15 MINUTES: Performed by: OBSTETRICS & GYNECOLOGY

## 2024-09-19 PROCEDURE — 36000710: Performed by: OBSTETRICS & GYNECOLOGY

## 2024-09-19 PROCEDURE — 27201423 OPTIME MED/SURG SUP & DEVICES STERILE SUPPLY: Performed by: OBSTETRICS & GYNECOLOGY

## 2024-09-19 PROCEDURE — 58573 TLH W/T/O UTERUS OVER 250 G: CPT | Mod: ,,, | Performed by: OBSTETRICS & GYNECOLOGY

## 2024-09-19 PROCEDURE — 88307 TISSUE EXAM BY PATHOLOGIST: CPT | Mod: 26,,, | Performed by: PATHOLOGY

## 2024-09-19 PROCEDURE — 27000165 HC TUBE, ETT CUFFED: Performed by: ANESTHESIOLOGY

## 2024-09-19 PROCEDURE — 27000689 HC BLADE LARYNGOSCOPE ANY SIZE: Performed by: ANESTHESIOLOGY

## 2024-09-19 PROCEDURE — 25000003 PHARM REV CODE 250: Performed by: ANESTHESIOLOGY

## 2024-09-19 RX ORDER — DEXAMETHASONE SODIUM PHOSPHATE 4 MG/ML
INJECTION, SOLUTION INTRA-ARTICULAR; INTRALESIONAL; INTRAMUSCULAR; INTRAVENOUS; SOFT TISSUE
Status: DISCONTINUED | OUTPATIENT
Start: 2024-09-19 | End: 2024-09-19

## 2024-09-19 RX ORDER — FUROSEMIDE 10 MG/ML
INJECTION INTRAMUSCULAR; INTRAVENOUS
Status: DISCONTINUED | OUTPATIENT
Start: 2024-09-19 | End: 2024-09-19

## 2024-09-19 RX ORDER — MEPERIDINE HYDROCHLORIDE 25 MG/ML
25 INJECTION INTRAMUSCULAR; INTRAVENOUS; SUBCUTANEOUS EVERY 10 MIN PRN
Status: DISCONTINUED | OUTPATIENT
Start: 2024-09-19 | End: 2024-09-19 | Stop reason: HOSPADM

## 2024-09-19 RX ORDER — SCOLOPAMINE TRANSDERMAL SYSTEM 1 MG/1
1 PATCH, EXTENDED RELEASE TRANSDERMAL ONCE
Status: DISCONTINUED | OUTPATIENT
Start: 2024-09-19 | End: 2024-09-19 | Stop reason: HOSPADM

## 2024-09-19 RX ORDER — ONDANSETRON 4 MG/1
8 TABLET, ORALLY DISINTEGRATING ORAL EVERY 8 HOURS PRN
Status: DISCONTINUED | OUTPATIENT
Start: 2024-09-19 | End: 2024-09-19 | Stop reason: HOSPADM

## 2024-09-19 RX ORDER — PROMETHAZINE HYDROCHLORIDE 25 MG/ML
INJECTION, SOLUTION INTRAMUSCULAR; INTRAVENOUS
Status: DISCONTINUED | OUTPATIENT
Start: 2024-09-19 | End: 2024-09-19

## 2024-09-19 RX ORDER — ROCURONIUM BROMIDE 10 MG/ML
INJECTION, SOLUTION INTRAVENOUS
Status: DISCONTINUED | OUTPATIENT
Start: 2024-09-19 | End: 2024-09-19

## 2024-09-19 RX ORDER — ONDANSETRON HYDROCHLORIDE 2 MG/ML
4 INJECTION, SOLUTION INTRAVENOUS DAILY PRN
Status: DISCONTINUED | OUTPATIENT
Start: 2024-09-19 | End: 2024-09-19 | Stop reason: HOSPADM

## 2024-09-19 RX ORDER — ROPIVACAINE HYDROCHLORIDE 7.5 MG/ML
INJECTION, SOLUTION EPIDURAL; PERINEURAL
Status: COMPLETED | OUTPATIENT
Start: 2024-09-19 | End: 2024-09-19

## 2024-09-19 RX ORDER — PROPOFOL 10 MG/ML
VIAL (ML) INTRAVENOUS
Status: DISCONTINUED | OUTPATIENT
Start: 2024-09-19 | End: 2024-09-19

## 2024-09-19 RX ORDER — PHENYLEPHRINE HYDROCHLORIDE 10 MG/ML
INJECTION INTRAVENOUS
Status: DISCONTINUED | OUTPATIENT
Start: 2024-09-19 | End: 2024-09-19

## 2024-09-19 RX ORDER — DIPHENHYDRAMINE HYDROCHLORIDE 50 MG/ML
25 INJECTION INTRAMUSCULAR; INTRAVENOUS EVERY 6 HOURS PRN
Status: DISCONTINUED | OUTPATIENT
Start: 2024-09-19 | End: 2024-09-19 | Stop reason: HOSPADM

## 2024-09-19 RX ORDER — FENTANYL CITRATE 50 UG/ML
INJECTION, SOLUTION INTRAMUSCULAR; INTRAVENOUS
Status: DISCONTINUED | OUTPATIENT
Start: 2024-09-19 | End: 2024-09-19

## 2024-09-19 RX ORDER — SIMETHICONE 80 MG
80 TABLET,CHEWABLE ORAL EVERY 4 HOURS PRN
Status: DISCONTINUED | OUTPATIENT
Start: 2024-09-19 | End: 2024-09-19 | Stop reason: HOSPADM

## 2024-09-19 RX ORDER — HYDROMORPHONE HYDROCHLORIDE 2 MG/ML
0.5 INJECTION, SOLUTION INTRAMUSCULAR; INTRAVENOUS; SUBCUTANEOUS EVERY 5 MIN PRN
Status: DISCONTINUED | OUTPATIENT
Start: 2024-09-19 | End: 2024-09-19 | Stop reason: HOSPADM

## 2024-09-19 RX ORDER — GLUCAGON 1 MG
1 KIT INJECTION
Status: DISCONTINUED | OUTPATIENT
Start: 2024-09-19 | End: 2024-09-19 | Stop reason: HOSPADM

## 2024-09-19 RX ORDER — HYDROCODONE BITARTRATE AND ACETAMINOPHEN 7.5; 325 MG/1; MG/1
1 TABLET ORAL EVERY 6 HOURS PRN
Qty: 28 TABLET | Refills: 0 | Status: SHIPPED | OUTPATIENT
Start: 2024-09-19

## 2024-09-19 RX ORDER — MUPIROCIN 20 MG/G
1 OINTMENT TOPICAL 2 TIMES DAILY
Status: DISCONTINUED | OUTPATIENT
Start: 2024-09-19 | End: 2024-09-19 | Stop reason: HOSPADM

## 2024-09-19 RX ORDER — MORPHINE SULFATE 10 MG/ML
3 INJECTION INTRAMUSCULAR; INTRAVENOUS; SUBCUTANEOUS
Status: DISCONTINUED | OUTPATIENT
Start: 2024-09-19 | End: 2024-09-19 | Stop reason: HOSPADM

## 2024-09-19 RX ORDER — MORPHINE SULFATE 10 MG/ML
4 INJECTION INTRAMUSCULAR; INTRAVENOUS; SUBCUTANEOUS EVERY 5 MIN PRN
Status: DISCONTINUED | OUTPATIENT
Start: 2024-09-19 | End: 2024-09-19 | Stop reason: HOSPADM

## 2024-09-19 RX ORDER — HYDROCODONE BITARTRATE AND ACETAMINOPHEN 5; 325 MG/1; MG/1
1 TABLET ORAL EVERY 4 HOURS PRN
Status: DISCONTINUED | OUTPATIENT
Start: 2024-09-19 | End: 2024-09-19 | Stop reason: HOSPADM

## 2024-09-19 RX ORDER — DIPHENHYDRAMINE HCL 25 MG
25 CAPSULE ORAL EVERY 4 HOURS PRN
Status: DISCONTINUED | OUTPATIENT
Start: 2024-09-19 | End: 2024-09-19 | Stop reason: HOSPADM

## 2024-09-19 RX ORDER — ONDANSETRON HYDROCHLORIDE 2 MG/ML
INJECTION, SOLUTION INTRAVENOUS
Status: DISCONTINUED | OUTPATIENT
Start: 2024-09-19 | End: 2024-09-19

## 2024-09-19 RX ORDER — LIDOCAINE HYDROCHLORIDE 20 MG/ML
INJECTION, SOLUTION EPIDURAL; INFILTRATION; INTRACAUDAL; PERINEURAL
Status: DISCONTINUED | OUTPATIENT
Start: 2024-09-19 | End: 2024-09-19

## 2024-09-19 RX ORDER — SODIUM CHLORIDE, SODIUM LACTATE, POTASSIUM CHLORIDE, CALCIUM CHLORIDE 600; 310; 30; 20 MG/100ML; MG/100ML; MG/100ML; MG/100ML
INJECTION, SOLUTION INTRAVENOUS CONTINUOUS
Status: DISCONTINUED | OUTPATIENT
Start: 2024-09-19 | End: 2024-09-19 | Stop reason: HOSPADM

## 2024-09-19 RX ORDER — MIDAZOLAM HYDROCHLORIDE 1 MG/ML
INJECTION INTRAMUSCULAR; INTRAVENOUS
Status: DISCONTINUED | OUTPATIENT
Start: 2024-09-19 | End: 2024-09-19

## 2024-09-19 RX ORDER — HYDROMORPHONE HYDROCHLORIDE 2 MG/ML
INJECTION, SOLUTION INTRAMUSCULAR; INTRAVENOUS; SUBCUTANEOUS
Status: DISCONTINUED | OUTPATIENT
Start: 2024-09-19 | End: 2024-09-19

## 2024-09-19 RX ADMIN — MIDAZOLAM HYDROCHLORIDE 2 MG: 1 INJECTION, SOLUTION INTRAMUSCULAR; INTRAVENOUS at 07:09

## 2024-09-19 RX ADMIN — DEXAMETHASONE SODIUM PHOSPHATE 8 MG: 4 INJECTION, SOLUTION INTRA-ARTICULAR; INTRALESIONAL; INTRAMUSCULAR; INTRAVENOUS; SOFT TISSUE at 07:09

## 2024-09-19 RX ADMIN — DEXTROSE MONOHYDRATE 2 G: 50 INJECTION, SOLUTION INTRAVENOUS at 07:09

## 2024-09-19 RX ADMIN — SCOPOLAMINE 1 PATCH: 1.5 PATCH, EXTENDED RELEASE TRANSDERMAL at 06:09

## 2024-09-19 RX ADMIN — HYDROMORPHONE HYDROCHLORIDE 0.5 MG: 2 INJECTION, SOLUTION INTRAMUSCULAR; INTRAVENOUS; SUBCUTANEOUS at 08:09

## 2024-09-19 RX ADMIN — FENTANYL CITRATE 100 MCG: 50 INJECTION, SOLUTION INTRAMUSCULAR; INTRAVENOUS at 07:09

## 2024-09-19 RX ADMIN — PROPOFOL 150 MG: 10 INJECTION, EMULSION INTRAVENOUS at 07:09

## 2024-09-19 RX ADMIN — ROCURONIUM BROMIDE 50 MG: 10 INJECTION, SOLUTION INTRAVENOUS at 07:09

## 2024-09-19 RX ADMIN — SODIUM CHLORIDE: 9 INJECTION, SOLUTION INTRAVENOUS at 07:09

## 2024-09-19 RX ADMIN — PHENYLEPHRINE HYDROCHLORIDE 100 MCG: 10 INJECTION INTRAVENOUS at 07:09

## 2024-09-19 RX ADMIN — SODIUM CHLORIDE, POTASSIUM CHLORIDE, SODIUM LACTATE AND CALCIUM CHLORIDE: 600; 310; 30; 20 INJECTION, SOLUTION INTRAVENOUS at 08:09

## 2024-09-19 RX ADMIN — PHENYLEPHRINE HYDROCHLORIDE 100 MCG: 10 INJECTION INTRAVENOUS at 08:09

## 2024-09-19 RX ADMIN — HYDROMORPHONE HYDROCHLORIDE 0.5 MG: 2 INJECTION, SOLUTION INTRAMUSCULAR; INTRAVENOUS; SUBCUTANEOUS at 09:09

## 2024-09-19 RX ADMIN — FUROSEMIDE 10 MG: 10 INJECTION, SOLUTION INTRAMUSCULAR; INTRAVENOUS at 08:09

## 2024-09-19 RX ADMIN — ONDANSETRON 4 MG: 2 INJECTION INTRAMUSCULAR; INTRAVENOUS at 07:09

## 2024-09-19 RX ADMIN — ROPIVACAINE HYDROCHLORIDE 20 ML: 7.5 INJECTION, SOLUTION EPIDURAL; PERINEURAL at 09:09

## 2024-09-19 RX ADMIN — LIDOCAINE HYDROCHLORIDE 100 MG: 20 INJECTION, SOLUTION INTRAVENOUS at 07:09

## 2024-09-19 RX ADMIN — PROMETHAZINE HYDROCHLORIDE 5 MG: 25 INJECTION INTRAMUSCULAR; INTRAVENOUS at 07:09

## 2024-09-19 RX ADMIN — SUGAMMADEX 200 MG: 100 INJECTION, SOLUTION INTRAVENOUS at 09:09

## 2024-09-19 NOTE — ANESTHESIA PROCEDURE NOTES
Peripheral Block    Patient location during procedure: OR   Block not for primary anesthetic.  Reason for block: at surgeon's request and post-op pain management   Post-op Pain Location: Bilateral abdomen   Start time: 9/19/2024 9:19 AM  Timeout: 9/19/2024 9:18 AM   End time: 9/19/2024 9:23 AM    Staffing  Authorizing Provider: Clinton Carreon MD  Performing Provider: Clinton Carreon MD    Staffing  Performed by: Clinton Carreon MD  Authorized by: Clinton Carreon MD    Preanesthetic Checklist  Completed: patient identified, risks and benefits discussed, pre-op evaluation and timeout performed  Peripheral Block  Patient position: supine  Prep: ChloraPrep  Block type: TAP  Laterality: bilateral  Injection technique: single shot  Needle  Needle localization: ultrasound guidance     Assessment  Injection assessment: negative aspiration    Medications:    Medications: ROPIvacaine (NAROPIN) injection 0.75% - Perineural   20 mL - 9/19/2024 9:23:00 AM    Additional Notes  Ropivicaine diluted to 0.375%.   20 cc on each side.   No complications.

## 2024-09-19 NOTE — ANESTHESIA PREPROCEDURE EVALUATION
09/19/2024  Erik Buchanan is a 54 y.o., female.      Pre-op Assessment    I have reviewed the Patient Summary Reports.    I have reviewed the NPO Status.   I have reviewed the Medications.     Review of Systems         Anesthesia Plan  Type of Anesthesia, risks & benefits discussed:    Anesthesia Type: Gen ETT, Regional  Intra-op Monitoring Plan: Standard ASA Monitors  Post Op Pain Control Plan: multimodal analgesia  Induction:  IV  Informed Consent: Informed consent signed with the Patient and all parties understand the risks and agree with anesthesia plan.  All questions answered.   ASA Score: 1    Ready For Surgery From Anesthesia Perspective.     .  NPO greater than 8 hours  No anesthetic complications  NKDA    Hct 38  HCG negative    Denies other medical conditions    MP I; adequate ROM at neck    Plan is GETA plus TAP block

## 2024-09-19 NOTE — TRANSFER OF CARE
"Anesthesia Transfer of Care Note    Patient: Erik Buchanan    Procedure(s) Performed: Procedure(s) (LRB):  HYSTERECTOMY,TOTAL,LAPAROSCOPIC,WITH SALPINGO-OOPHORECTOMY (Bilateral)  CYSTOSCOPY (N/A)    Patient location: PACU    Anesthesia Type: general    Transport from OR: Transported from OR on 6-10 L/min O2 by face mask with adequate spontaneous ventilation    Post pain: adequate analgesia    Post assessment: no apparent anesthetic complications and tolerated procedure well    Post vital signs: stable    Level of consciousness: responds to stimulation    Nausea/Vomiting: no nausea/vomiting    Complications: none    Transfer of care protocol was followedComments: Report Given to PACU rn VSS      Last vitals: Visit Vitals  /86   Pulse 87   Temp 36.3 °C (97.3 °F) (Oral)   Resp 19   Ht 5' 7" (1.702 m)   Wt 119.3 kg (263 lb)   LMP 07/22/2024 (Approximate)   SpO2 (!) 91%   Breastfeeding No   BMI 41.19 kg/m²     "

## 2024-09-19 NOTE — OP NOTE
DATE OF PROCEDURE: 09/19/2024    PREOPERATIVE DIAGNOSES:   1. DUB  2.enlarged uterus       POSTOPERATIVE DIAGNOSES:   1. same      PROCEDURE: Single-site TLH, BSO    SURGEON: Bailey Guzman MD    ASSISTANT: general    ANESTHESIA: general    ESTIMATED BLOOD LOSS: 50 mL.     COMPLICATIONS: None.     FINDINGS: uterus sounds to 12 cm, multiple leiomyomas    PROCEDURE IN DETAIL:   Following informed consent the patient was taken to the operating room where general anesthesia was administered without difficulty. She was prepped and draped in usual sterile fashion placed dorsolithotomy position. A weighted speculum was placed the vagina and the anterior lip cervix grasped single-toothed tenaculum. The uterus was gently sounded and found to be 8 cm in length. A V care was then inserted into the uterus for adequate manipulation. Attention was then turned to the abdomen where a 3 mm incision was made in the umbilicus with the scalpel. The fascial incision was entered with the [place] scissors and extended laterally. The peritoneum was entered manually with ease. The single site gel port was then introduced to the abdomen with ease through the umbilical incision and the pneumoperitoneum achieved with 3 L of CO2 gas. The patient was then placed in deep Trendelenburg position and a pelvic exam with the findings noted above. The IP ligament was grasped with the Ligasure device , cauterized and  transected with hemostasis assured removing the left ovary.. The round ligament on the left was clamped cauterized transected hemostasis found be satisfactory. Anterior posterior leafs of the broad ligament were entered using the scissors and bladder flap created to midline with ease. The steps were then repeated on the right with hemostasis found be satisfactory. The uterus and cervix were then amputated using the J-hook along the co-ring and the specimen delivered through the vagina with ease. The vaginal cuff was then closed with  number 1V LOC starting the right angle suturing across to left and back in the midline. The vaginal cuff was found be hemostatic and airtight. The pelvis was then copiously  irrigated with normal saline dissatisfaction. The right and left ureters were identified and found to be peristalsing nicely. The pneumoperitoneum evacuated and the gel port removed from the umbilicus. The fascial incision was repaired with 0 Vicryl in running fashion. The skin was closed with 4-0 Vicryl and Surgicel. The patient was given. An intraoperative cystoscopy. Performed with eflux of urine noted from both the right and left ureters. All instrument removed from the patient was waking general anesthesia and taken cover room stable condition. At the end of the procedure all sponge lap needle and instrument counts correct ×2

## 2024-09-19 NOTE — ANESTHESIA POSTPROCEDURE EVALUATION
Anesthesia Post Evaluation    Patient: Erik Buchanan    Procedure(s) Performed: Procedure(s) (LRB):  HYSTERECTOMY,TOTAL,LAPAROSCOPIC,WITH SALPINGO-OOPHORECTOMY (Bilateral)  CYSTOSCOPY (N/A)    Final Anesthesia Type: general      Patient location during evaluation: PACU  Post-procedure vital signs: reviewed and stable  Pain management: adequate  Airway patency: patent    PONV status at discharge: No PONV  Anesthetic complications: no      Cardiovascular status: hemodynamically stable  Respiratory status: unassisted  Hydration status: euvolemic  Follow-up not needed.              Vitals Value Taken Time   /70 09/19/24 1331   Temp 36.3 °C (97.3 °F) 09/19/24 0949   Pulse 86 09/19/24 1332   Resp 20 09/19/24 1300   SpO2 96 % 09/19/24 1332   Vitals shown include unfiled device data.      Event Time   Out of Recovery 10:30:00         Pain/Carly Score: Carly Score: 10 (9/19/2024  1:40 PM)  Modified Carly Score: 20 (9/19/2024  1:40 PM)

## 2024-09-19 NOTE — ANESTHESIA PROCEDURE NOTES
Intubation    Date/Time: 9/19/2024 7:13 AM    Performed by: Margy Rosas  Authorized by: Clinton Carreon MD    Intubation:     Induction:  Inhalational - mask    Intubated:  Postinduction    Mask Ventilation:  Easy mask    Attempts:  1    Attempted By:  Student    Method of Intubation:  Direct    Blade:  Lopez 2    Laryngeal View Grade: Grade I - full view of cords      Difficult Airway Encountered?: No      Complications:  None    Airway Device:  Oral endotracheal tube    Airway Device Size:  7.5    Style/Cuff Inflation:  Cuffed    Inflation Amount (mL):  10    Tube secured:  22    Secured at:  The lips    Placement Verified By:  Capnometry    Complicating Factors:  None    Findings Post-Intubation:  BS equal bilateral and atraumatic/condition of teeth unchanged  Notes:      Intubated by KAILA Rosas SRNA

## 2024-09-19 NOTE — PROGRESS NOTES
943 RECEIVED TO RR EASILY AROUSED, SLEEPY NASAL AIRWAY RIGHT NOSTRIL O2 VIA FM. NO RESP. DISTRESS NOTED. ABDOMEN SOFT WITH OP-SITE NAVEL AREA APPROXIMATED WITH DERMABOND ADHESIVE DRESSING. RIAZ PAD WITH SCANT DRAINAGE NOTED. CLEAN PAD APPLIED.  SCD HOSE TO LOWER EXTREMITIES. IV INFUSING WELL LEFT HAND 20G. CATH. REPOSITION SELF ON LEFT SIDE. NO DISTRESS NOTED. SEE FLOWSHEET.    1015 SLEEPY, FOLLOWS COMMANDS, ENCOURAGED COUGH DEEP BREATHS. NO C/O PAIN.      1020 NASAL TRUMPET REMOVED, MORE AWAKE. ENCOURAGED DEEP BREATHS AND COUGH. NO C/O PAIN. NO VAGINAL BLEEDING ABDOMEN SOFT. O2 VIA NC.      1030 AWAKE, ALERT. NO C/O PAIN. NO S/S OF DISTRESS NOTED. TRANSFERRED TO ROOM.

## 2024-09-20 LAB
ESTROGEN SERPL-MCNC: NORMAL PG/ML
INSULIN SERPL-ACNC: NORMAL U[IU]/ML
LAB AP GROSS DESCRIPTION: NORMAL
LAB AP LABORATORY NOTES: NORMAL
T3RU NFR SERPL: NORMAL %

## 2024-10-07 ENCOUNTER — OFFICE VISIT (OUTPATIENT)
Dept: OBSTETRICS AND GYNECOLOGY | Facility: CLINIC | Age: 54
End: 2024-10-07
Payer: COMMERCIAL

## 2024-10-07 VITALS
DIASTOLIC BLOOD PRESSURE: 71 MMHG | HEART RATE: 84 BPM | WEIGHT: 255.81 LBS | BODY MASS INDEX: 40.06 KG/M2 | SYSTOLIC BLOOD PRESSURE: 131 MMHG

## 2024-10-07 DIAGNOSIS — Z90.710 STATUS POST LAPAROSCOPIC HYSTERECTOMY: Primary | ICD-10-CM

## 2024-10-07 PROCEDURE — 3075F SYST BP GE 130 - 139MM HG: CPT | Mod: ,,, | Performed by: OBSTETRICS & GYNECOLOGY

## 2024-10-07 PROCEDURE — 99024 POSTOP FOLLOW-UP VISIT: CPT | Mod: ,,, | Performed by: OBSTETRICS & GYNECOLOGY

## 2024-10-07 PROCEDURE — 3078F DIAST BP <80 MM HG: CPT | Mod: ,,, | Performed by: OBSTETRICS & GYNECOLOGY

## 2024-10-07 NOTE — PROGRESS NOTES
Subjective     Patient ID: Erik Buchanan is a 54 y.o. female.    Chief Complaint:  Post-op Evaluation      History of Present Illness  HPI  Postoperative Follow-up  Patient presents to the clinic 2 weeks status post total laparoscopic hysterectomy, bilateral oophorectomy, and bilateral salpingectomy for abnormal uterine bleeding and pelvic pain. Eating a regular diet without difficulty. Bowel movements are normal. The patient is not having any pain.    GYN & OB History  Patient's last menstrual period was 2024 (approximate).   Date of Last Pap: 2024    OB History    Para Term  AB Living   4 2 1 1 1     SAB IAB Ectopic Multiple Live Births   1              # Outcome Date GA Lbr Alexandru/2nd Weight Sex Type Anes PTL Lv   4             3  13 28w0d  1.332 kg (2 lb 15 oz) M Vag-Spont      2 SAB 1996 6w0d   U       1 Term 91 40w0d  3.941 kg (8 lb 11 oz) M Vag-Spont          Review of Systems  Review of Systems   Constitutional:  Negative for activity change, appetite change, fatigue and unexpected weight change.   HENT: Negative.     Respiratory:  Negative for cough, shortness of breath and wheezing.    Cardiovascular:  Negative for chest pain, palpitations and leg swelling.   Gastrointestinal:  Negative for abdominal pain, bloating, blood in stool, constipation, diarrhea, nausea, vomiting and reflux.   Genitourinary:  Negative for bladder incontinence, decreased libido, dysmenorrhea, dyspareunia, dysuria, flank pain, frequency, hot flashes, menorrhagia, menstrual problem, pelvic pain, urgency, vaginal bleeding, vaginal discharge, urinary incontinence, postcoital bleeding, postmenopausal bleeding, vaginal dryness and vaginal odor.   Musculoskeletal:  Negative for back pain.   Integumentary:  Negative for rash, acne, hair changes, mole/lesion, breast mass, nipple discharge, breast skin changes and breast tenderness.   Neurological:  Negative for headaches.    Psychiatric/Behavioral:  Negative for depression and sleep disturbance. The patient is not nervous/anxious.    Breast: Negative for asymmetry, breast self exam, lump, mass, nipple discharge, skin changes and tenderness         Objective   Physical Exam:   Constitutional: She is oriented to person, place, and time. She appears well-developed and well-nourished.    HENT:   Head: Normocephalic and atraumatic.    Eyes: Pupils are equal, round, and reactive to light. EOM are normal.     Cardiovascular:  Normal rate, regular rhythm and normal heart sounds.      Exam reveals no clubbing, no cyanosis and no edema.        Pulmonary/Chest: Effort normal and breath sounds normal.        Abdominal: Soft. Bowel sounds are normal. She exhibits abdominal incision (umbilical incision healing well). She exhibits no distension.     Genitourinary:    Uterus normal.             Musculoskeletal: Normal range of motion and moves all extremeties.       Neurological: She is alert and oriented to person, place, and time.    Skin: Skin is warm and dry. No cyanosis. Nails show no clubbing.    Psychiatric: She has a normal mood and affect. Her behavior is normal. Judgment and thought content normal.            Assessment and Plan     1. Status post laparoscopic hysterectomy       Patient is allowed to drive only/ RTC in 4 weeks.      Plan:  Pelvic rest  RTC in 4 weeks

## 2024-11-04 ENCOUNTER — OFFICE VISIT (OUTPATIENT)
Dept: OBSTETRICS AND GYNECOLOGY | Facility: CLINIC | Age: 54
End: 2024-11-04
Payer: COMMERCIAL

## 2024-11-04 VITALS
DIASTOLIC BLOOD PRESSURE: 79 MMHG | HEART RATE: 84 BPM | SYSTOLIC BLOOD PRESSURE: 155 MMHG | BODY MASS INDEX: 40.31 KG/M2 | WEIGHT: 257.38 LBS

## 2024-11-04 DIAGNOSIS — Z90.710 STATUS POST LAPAROSCOPIC HYSTERECTOMY: ICD-10-CM

## 2024-11-04 DIAGNOSIS — Z12.31 ENCOUNTER FOR SCREENING MAMMOGRAM FOR HIGH-RISK PATIENT: Primary | ICD-10-CM

## 2024-11-04 PROCEDURE — 3077F SYST BP >= 140 MM HG: CPT | Mod: ,,, | Performed by: OBSTETRICS & GYNECOLOGY

## 2024-11-04 PROCEDURE — 99024 POSTOP FOLLOW-UP VISIT: CPT | Mod: ,,, | Performed by: OBSTETRICS & GYNECOLOGY

## 2024-11-04 PROCEDURE — 3008F BODY MASS INDEX DOCD: CPT | Mod: ,,, | Performed by: OBSTETRICS & GYNECOLOGY

## 2024-11-04 PROCEDURE — 3078F DIAST BP <80 MM HG: CPT | Mod: ,,, | Performed by: OBSTETRICS & GYNECOLOGY

## 2024-11-04 PROCEDURE — 1159F MED LIST DOCD IN RCRD: CPT | Mod: ,,, | Performed by: OBSTETRICS & GYNECOLOGY

## 2024-11-04 NOTE — PROGRESS NOTES
Subjective     Patient ID: Erik Buchanan is a 54 y.o. female.    Chief Complaint:  Post-op Evaluation      History of Present Illness  HPI  Postoperative Follow-up  Patient presents to the clinic 6 weeks status post total laparoscopic hysterectomy for abnormal uterine bleeding. Eating a regular diet without difficulty. Bowel movements are normal. The patient is not having any pain.    GYN & OB History  Patient's last menstrual period was 2024 (approximate).   Date of Last Pap: 2024    OB History    Para Term  AB Living   4 2 1 1 1     SAB IAB Ectopic Multiple Live Births   1              # Outcome Date GA Lbr Alexandru/2nd Weight Sex Type Anes PTL Lv   4             3  13 28w0d  1.332 kg (2 lb 15 oz) M Vag-Spont      2 SAB 1996 6w0d   U       1 Term 91 40w0d  3.941 kg (8 lb 11 oz) M Vag-Spont          Review of Systems  Review of Systems   Constitutional:  Negative for activity change, appetite change, fatigue and unexpected weight change.   HENT: Negative.     Respiratory:  Negative for cough, shortness of breath and wheezing.    Cardiovascular:  Negative for chest pain, palpitations and leg swelling.   Gastrointestinal:  Negative for abdominal pain, bloating, blood in stool, constipation, diarrhea, nausea, vomiting and reflux.   Genitourinary:  Negative for bladder incontinence, decreased libido, dysmenorrhea, dyspareunia, dysuria, flank pain, frequency, hot flashes, menorrhagia, menstrual problem, pelvic pain, urgency, vaginal bleeding, vaginal discharge, urinary incontinence, postcoital bleeding, postmenopausal bleeding, vaginal dryness and vaginal odor.   Musculoskeletal:  Negative for back pain.   Integumentary:  Negative for rash, acne, hair changes, mole/lesion, breast mass, nipple discharge, breast skin changes and breast tenderness.   Neurological:  Negative for headaches.   Psychiatric/Behavioral:  Negative for depression and sleep disturbance. The  patient is not nervous/anxious.    Breast: Negative for asymmetry, breast self exam, lump, mass, nipple discharge, skin changes and tenderness         Objective   Physical Exam:   Constitutional: She is oriented to person, place, and time. She appears well-developed and well-nourished.    HENT:   Head: Normocephalic and atraumatic.    Eyes: Pupils are equal, round, and reactive to light. Conjunctivae and EOM are normal.     Cardiovascular:  Normal rate, regular rhythm and normal heart sounds.             Pulmonary/Chest: Effort normal and breath sounds normal.        Abdominal: Soft. Bowel sounds are normal.     Genitourinary:    Vagina, right adnexa and left adnexa normal.      Pelvic exam was performed with patient supine.   Cervix is absent.Uterus is absent.           Musculoskeletal: Normal range of motion and moves all extremeties.       Neurological: She is alert and oriented to person, place, and time.    Skin: Skin is warm and dry.    Psychiatric: She has a normal mood and affect. Her behavior is normal. Thought content normal.            Assessment and Plan     1. Encounter for screening mammogram for high-risk patient    2. Status post laparoscopic hysterectomy             Plan:     Mammogram ordered  RTC in 1 year for annual gyn exam  Pt may resume intercourse in 2 weeks

## 2024-11-19 ENCOUNTER — PATIENT MESSAGE (OUTPATIENT)
Dept: RESEARCH | Facility: HOSPITAL | Age: 54
End: 2024-11-19

## 2025-04-25 ENCOUNTER — HOSPITAL ENCOUNTER (OUTPATIENT)
Dept: RADIOLOGY | Facility: HOSPITAL | Age: 55
Discharge: HOME | End: 2025-04-25
Attending: OBSTETRICS & GYNECOLOGY
Payer: COMMERCIAL

## 2025-04-25 VITALS — BODY MASS INDEX: 40.1 KG/M2 | WEIGHT: 256 LBS

## 2025-04-25 DIAGNOSIS — Z12.31 OTHER SCREENING MAMMOGRAM: ICD-10-CM

## 2025-04-25 PROCEDURE — 77067 SCR MAMMO BI INCL CAD: CPT | Mod: 26,,, | Performed by: RADIOLOGY

## 2025-04-25 PROCEDURE — 77063 BREAST TOMOSYNTHESIS BI: CPT | Mod: 26,,, | Performed by: RADIOLOGY

## 2025-04-25 PROCEDURE — 77063 BREAST TOMOSYNTHESIS BI: CPT | Mod: TC

## (undated) DEVICE — ADHESIVE DERMABOND ADVANCED

## (undated) DEVICE — MANIPULATOR UTERINE 3.5CM

## (undated) DEVICE — IRRIGATOR ENDOSCOPY DISP.

## (undated) DEVICE — WARMER BLUE HEAT SCOPE 3-12MM

## (undated) DEVICE — SUT 0 VICRYL / UR6 (J603)

## (undated) DEVICE — CART INSERT SCISSOR F 5X34CM

## (undated) DEVICE — SUT VICRYL PLUS 4-0 FS-2 27IN

## (undated) DEVICE — GLOVE SENSICARE PI SURG 6.5

## (undated) DEVICE — SUT VICRYL PLUS 1 CTX 36IN

## (undated) DEVICE — GELPOINT MINI KIT ENDOSCOPIC

## (undated) DEVICE — ELECTRODE LAPSCP L HOOK 36CM

## (undated) DEVICE — GLOVE SENSICARE PI GRN 7

## (undated) DEVICE — RELOAD V-LOC 180 0 8IN/20CM

## (undated) DEVICE — SET CYSTO IRR DRP CHMBR 84IN

## (undated) DEVICE — PENCIL ELECTROSURG HOLST W/BLD

## (undated) DEVICE — MATRIX FLOSEAL HEMOSTATIC 10ML

## (undated) DEVICE — SET PNEUMOCLEAR HEAT HUM SE HF

## (undated) DEVICE — SYR 50CC LL

## (undated) DEVICE — GOWN POLY REINF BRTH SLV XL

## (undated) DEVICE — APPLICATOR FLOSEAL ENDO 35CM

## (undated) DEVICE — KIT GYN LAPAROSCOPY RUSH

## (undated) DEVICE — SOL NACL IRR 3000ML

## (undated) DEVICE — TRAY CATH FOL SIL URIMTR 16FR

## (undated) DEVICE — DISSECTOR KITTNER 5MM T 45CM S

## (undated) DEVICE — GLOVE SENSICARE PI GRN 6.5

## (undated) DEVICE — OCCLUDER COLPO-PNEUMO STERILE

## (undated) DEVICE — ENDOSTITCH INSTRUMENT

## (undated) DEVICE — GLOVE SENSICARE PI SURG 6

## (undated) DEVICE — FUSION DEVICE VOYANT BLUNT TIP 5MMX37CM

## (undated) DEVICE — GLOVE SENSICARE PI SURG 7.5

## (undated) DEVICE — SYR 10CC LUER LOCK